# Patient Record
(demographics unavailable — no encounter records)

---

## 2024-11-04 NOTE — DISCUSSION/SUMMARY
[EKG obtained to assist in diagnosis and management of assessed problem(s)] : EKG obtained to assist in diagnosis and management of assessed problem(s) [FreeTextEntry1] : Patient is a 45yo M with family history of premature CAD, HLD, former smoker here for cardiac follow up  HLD: -Worsening lipids from BW 9/2024 , needs dietary improvements and more regular exercise -EST 9/2023 negative, good functional capacity (10METS) -CAC score 0 on 10/2023, lowers his CV risk   MR: -Echo 9/2023 with normal BiV function, mild MR not clinically significant at this time, surveillance only.    1. CV stable, continue aggressive risk factor modification and counselled on dietary habits/exercise. Recommend he repeat BW with his PMD in the spring and plans to do so 2. Recommend aggressive diet and lifestyle modifications  3. Recommend 30 minutes moderate intensity aerobic activity 5 days per week 4. Annual follow up

## 2024-11-04 NOTE — HISTORY OF PRESENT ILLNESS
[FreeTextEntry1] : Patient is a 45yo M with family history of premature CAD, HLD, former smoker here for cardiac follow up. No regular exercise, but no CP/SOB. Patient denies PND/orthopnea/edema/palpitations/syncope/claudication.    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   ROS: GI and  negative

## 2024-11-04 NOTE — PHYSICAL EXAM
[Well Developed] : well developed [Well Nourished] : well nourished [No Acute Distress] : no acute distress [Normal Conjunctiva] : normal conjunctiva [Normal Venous Pressure] : normal venous pressure [No Carotid Bruit] : no carotid bruit [Normal S1, S2] : normal S1, S2 [No Murmur] : no murmur [No Rub] : no rub [No Gallop] : no gallop [Clear Lung Fields] : clear lung fields [Good Air Entry] : good air entry [No Respiratory Distress] : no respiratory distress  [Soft] : abdomen soft [Non Tender] : non-tender [Normal Gait] : normal gait [No Edema] : no edema [No Cyanosis] : no cyanosis [No Clubbing] : no clubbing [Moves all extremities] : moves all extremities [Normal Speech] : normal speech [Alert and Oriented] : alert and oriented [de-identified] : obese

## 2024-11-04 NOTE — DISCUSSION/SUMMARY
[EKG obtained to assist in diagnosis and management of assessed problem(s)] : EKG obtained to assist in diagnosis and management of assessed problem(s) [FreeTextEntry1] : Patient is a 43yo M with family history of premature CAD, HLD, former smoker here for cardiac follow up  HLD: -Worsening lipids from BW 9/2024 , needs dietary improvements and more regular exercise -EST 9/2023 negative, good functional capacity (10METS) -CAC score 0 on 10/2023, lowers his CV risk   MR: -Echo 9/2023 with normal BiV function, mild MR not clinically significant at this time, surveillance only.    1. CV stable, continue aggressive risk factor modification and counselled on dietary habits/exercise. Recommend he repeat BW with his PMD in the spring and plans to do so 2. Recommend aggressive diet and lifestyle modifications  3. Recommend 30 minutes moderate intensity aerobic activity 5 days per week 4. Annual follow up

## 2024-11-04 NOTE — PHYSICAL EXAM
[Well Developed] : well developed [Well Nourished] : well nourished [No Acute Distress] : no acute distress [Normal Conjunctiva] : normal conjunctiva [Normal Venous Pressure] : normal venous pressure [No Carotid Bruit] : no carotid bruit [Normal S1, S2] : normal S1, S2 [No Murmur] : no murmur [No Rub] : no rub [No Gallop] : no gallop [Clear Lung Fields] : clear lung fields [Good Air Entry] : good air entry [No Respiratory Distress] : no respiratory distress  [Soft] : abdomen soft [Non Tender] : non-tender [Normal Gait] : normal gait [No Edema] : no edema [No Cyanosis] : no cyanosis [No Clubbing] : no clubbing [Moves all extremities] : moves all extremities [Normal Speech] : normal speech [Alert and Oriented] : alert and oriented [de-identified] : obese

## 2024-11-04 NOTE — HISTORY OF PRESENT ILLNESS
[FreeTextEntry1] : Patient is a 43yo M with family history of premature CAD, HLD, former smoker here for cardiac follow up. No regular exercise, but no CP/SOB. Patient denies PND/orthopnea/edema/palpitations/syncope/claudication.    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   ROS: GI and  negative

## 2024-11-04 NOTE — ASSESSMENT
[FreeTextEntry1] : ECG: SR, no significant ST-T abnormalities and normal intervals    HDL 39   (9/2024)   HDL 37 LDL 97  (2/2023)  A1C 5.4 (2/2023) CMP unremarkable (2/2023)   CT CAC Score = 0 (10/2023)   EST 9/2023: 1. Negative for ischemia 2. Achieved 9:37min, 10 METS 3. Normal HR/BP response , resting 108/76  ECHO 9/2023: 1. Normal LV size, systolic and diastolic function. EF 60-65% 2. Normal RV/LA/RA  3. Mild MR

## 2024-12-20 NOTE — ASSESSMENT
[FreeTextEntry1] : ASSESSMENT: The patient comes in today with chronic exacerbated complaints of right wrist discomfort.  We have discussed ulnar-sided wrist pain as well as TFCC and ECU irritation.  I do recommend bracing.  The patient elects for injections.    The patient was adequately and thoroughly informed of my assessment of their current condition(s).  - This may diminish bodily function for the extremity. We discussed prognosis, tx modalities including operative and nonoperative options for the above diagnostic assessment. As always, 2nd opinion is always provided as an option.  When accessible, I was able to review other physicians note(s) including reviewing other tests, imaging results as well as personally view these results for my own interpretation.   Injection:   The risks and benefits of a steroid injection were discussed in detail. The risks include but are not limited to: pain, infection, swelling, flare response, bleeding, subcutaneous fat atrophy, skin depigmentation and/or elevation of blood sugar. The risk of incomplete resolution of symptoms, recurrence and additional intervention was reviewed and considered by the patient. The patient agreed to proceed and under a sterile prep, I injected 1 unit 6mg into 1 cc of a combination of Celestone and Lidocaine into the right ECU subs sheath, right wrist joint. The patient tolerated the injection well.  The patient was adequately and thoroughly informed of my assessment of their current condition(s).  DISCUSSION: 1.  Injections as above.  Bracing provided.  Follow-up 2 months 2. [x] 3. [x]

## 2024-12-20 NOTE — HISTORY OF PRESENT ILLNESS
[FreeTextEntry1] : Vance is a very pleasant 44-year-old male presenting today with a chronic atraumatic history of right wrist discomfort difficulty with rotational activities and gripping and lifting.  It inhibits ADLs

## 2024-12-20 NOTE — PHYSICAL EXAM
[de-identified] : Examination of the [right] wrist reveals tenderness at the distal ulna dorsal with pain upon compression of the fovea. There is discomfort with ulnar grinding as well as with ends of pronation and supination at the ulnar wrist. There is discomfort with compression of the dorsal triquetrum. Examination of the [right] wrist reveals discomfort with compression at the level of the ECU. Synergy test elicits discomfort as well as resisted ulnar wrist extension. [de-identified] : [4] views of [bilateral hands and wrists] were obtained today in my office and were seen by me and discussed with the patient.  These mild to moderate positivity with clenched bilateral

## 2025-02-28 NOTE — HISTORY OF PRESENT ILLNESS
[FreeTextEntry1] : Vance is a very pleasant 44-year-old male presenting today with a chronic atraumatic history of right wrist discomfort difficulty with rotational activities and gripping and lifting.  It inhibits ADLs.  States that prior injections have been helpful

## 2025-02-28 NOTE — ASSESSMENT
[FreeTextEntry1] : ASSESSMENT: The patient comes in today with chronic exacerbated complaints of right wrist discomfort.  We have discussed ulnar-sided wrist pain as well as TFCC and ECU irritation.  I do recommend bracing.  The patient elects for injections once again as they have been helpful.   The patient was adequately and thoroughly informed of my assessment of their current condition(s).  - This may diminish bodily function for the extremity. We discussed prognosis, tx modalities including operative and nonoperative options for the above diagnostic assessment. As always, 2nd opinion is always provided as an option.  When accessible, I was able to review other physicians note(s) including reviewing other tests, imaging results as well as personally view these results for my own interpretation.   Injection:   The risks and benefits of a steroid injection were discussed in detail. The risks include but are not limited to: pain, infection, swelling, flare response, bleeding, subcutaneous fat atrophy, skin depigmentation and/or elevation of blood sugar. The risk of incomplete resolution of symptoms, recurrence and additional intervention was reviewed and considered by the patient. The patient agreed to proceed and under a sterile prep, I injected 1 unit 6mg into 1 cc of a combination of Celestone and Lidocaine into the right ECU subs sheath, right wrist joint. The patient tolerated the injection well.  The patient was adequately and thoroughly informed of my assessment of their current condition(s).  DISCUSSION: 1.  Injections as above.  Bracing provided.  Follow-up 2 months 2.  Positive response. 3. [x]

## 2025-02-28 NOTE — PHYSICAL EXAM
[de-identified] : Examination of the [right] wrist reveals tenderness at the distal ulna dorsal with pain upon compression of the fovea. There is discomfort with ulnar grinding as well as with ends of pronation and supination at the ulnar wrist. There is discomfort with compression of the dorsal triquetrum. Examination of the [right] wrist reveals discomfort with compression at the level of the ECU. Synergy test elicits discomfort as well as resisted ulnar wrist extension. [de-identified] : [4] views of [bilateral hands and wrists] were reviewed today in my office and were seen by me and discussed with the patient.  These mild to moderate positivity with clenched bilateral

## 2025-05-02 NOTE — PHYSICAL EXAM
[de-identified] : Examination of the [right] wrist reveals tenderness at the distal ulna dorsal with pain upon compression of the fovea. There is discomfort with ulnar grinding as well as with ends of pronation and supination at the ulnar wrist. There is discomfort with compression of the dorsal triquetrum. Examination of the [right] wrist reveals discomfort with compression at the level of the ECU. Synergy test elicits discomfort as well as resisted ulnar wrist extension. [de-identified] : [4] views of [bilateral hands and wrists] were reviewed today in my office and were seen by me and discussed with the patient.  These mild to moderate positivity with clenched bilateral

## 2025-05-02 NOTE — ASSESSMENT
[FreeTextEntry1] : ASSESSMENT: The patient comes in today with chronic exacerbated complaints of right wrist discomfort.  We have discussed ulnar-sided wrist pain as well as TFCC and ECU irritation.  I do recommend bracing.  The patient elects for injections once again as they have been helpful.   The patient was adequately and thoroughly informed of my assessment of their current condition(s).  - This may diminish bodily function for the extremity. We discussed prognosis, tx modalities including operative and nonoperative options for the above diagnostic assessment. As always, 2nd opinion is always provided as an option.  When accessible, I was able to review other physicians note(s) including reviewing other tests, imaging results as well as personally view these results for my own interpretation.   Injection procedure for right wrist ECU tendon subsheath:   The risks and benefits of a steroid injection were discussed in detail. The risks include but are not limited to: pain, infection, swelling, flare response, bleeding, subcutaneous fat atrophy, skin depigmentation and/or elevation of blood sugar. The risk of incomplete resolution of symptoms, recurrence and additional intervention was reviewed and considered by the patient. The patient agreed to proceed and under a sterile prep, I injected 1 unit 6mg into 1 cc of a combination of Celestone and Lidocaine into the above stated location for the procedure. The patient tolerated the injection well.  Injection procedure for right wrist joint TFCC-medium joint:   The risks and benefits of a steroid injection were discussed in detail. The risks include but are not limited to: pain, infection, swelling, flare response, bleeding, subcutaneous fat atrophy, skin depigmentation and/or elevation of blood sugar. The risk of incomplete resolution of symptoms, recurrence and additional intervention was reviewed and considered by the patient. The patient agreed to proceed and under a sterile prep, I injected 1 unit 6mg into 1 cc of a combination of Celestone and Lidocaine into the above stated location for the procedure. The patient tolerated the injection well.    The patient was adequately and thoroughly informed of my assessment of their current condition(s).  DISCUSSION: 1.  Injections as above.  Bracing provided.  Follow-up 3 months 2.  Positive response. 3. [x]